# Patient Record
Sex: FEMALE | Race: BLACK OR AFRICAN AMERICAN | Employment: UNEMPLOYED | URBAN - METROPOLITAN AREA
[De-identification: names, ages, dates, MRNs, and addresses within clinical notes are randomized per-mention and may not be internally consistent; named-entity substitution may affect disease eponyms.]

---

## 2018-11-23 ENCOUNTER — HOSPITAL ENCOUNTER (EMERGENCY)
Age: 53
Discharge: HOME | End: 2018-11-24
Attending: EMERGENCY MEDICINE
Payer: COMMERCIAL

## 2018-11-23 ENCOUNTER — APPOINTMENT (OUTPATIENT)
Dept: GENERAL RADIOLOGY | Age: 53
End: 2018-11-23
Payer: COMMERCIAL

## 2018-11-23 VITALS
OXYGEN SATURATION: 98 % | TEMPERATURE: 98.1 F | HEART RATE: 72 BPM | SYSTOLIC BLOOD PRESSURE: 163 MMHG | RESPIRATION RATE: 18 BRPM | DIASTOLIC BLOOD PRESSURE: 103 MMHG

## 2018-11-23 DIAGNOSIS — S16.1XXA STRAIN OF NECK MUSCLE, INITIAL ENCOUNTER: ICD-10-CM

## 2018-11-23 DIAGNOSIS — M79.672 LEFT FOOT PAIN: ICD-10-CM
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
## 2018-11-23 DIAGNOSIS — S39.012A STRAIN OF LUMBAR REGION, INITIAL ENCOUNTER: ICD-10-CM

## 2018-11-23 PROCEDURE — 72100 X-RAY EXAM L-S SPINE 2/3 VWS: CPT

## 2018-11-23 PROCEDURE — 6370000000 HC RX 637 (ALT 250 FOR IP): Performed by: PHYSICIAN ASSISTANT

## 2018-11-23 PROCEDURE — 99284 EMERGENCY DEPT VISIT MOD MDM: CPT

## 2018-11-23 PROCEDURE — 72040 X-RAY EXAM NECK SPINE 2-3 VW: CPT

## 2018-11-23 PROCEDURE — 73630 X-RAY EXAM OF FOOT: CPT

## 2018-11-23 RX ORDER — OXYCODONE HYDROCHLORIDE AND ACETAMINOPHEN 5; 325 MG/1; MG/1
1 TABLET ORAL ONCE
Status: COMPLETED | OUTPATIENT
Start: 2018-11-23 | End: 2018-11-23

## 2018-11-23 RX ADMIN — OXYCODONE HYDROCHLORIDE AND ACETAMINOPHEN 1 TABLET: 5; 325 TABLET ORAL at 22:26

## 2018-11-23 ASSESSMENT — PAIN DESCRIPTION - PAIN TYPE: TYPE: ACUTE PAIN

## 2018-11-23 ASSESSMENT — PAIN SCALES - GENERAL: PAINLEVEL_OUTOF10: 7

## 2018-11-23 ASSESSMENT — PAIN DESCRIPTION - LOCATION: LOCATION: BACK

## 2018-11-23 ASSESSMENT — PAIN DESCRIPTION - ORIENTATION: ORIENTATION: LOWER

## 2018-11-24 ASSESSMENT — ENCOUNTER SYMPTOMS
EYE PAIN: 0
SORE THROAT: 0
BACK PAIN: 1
VOMITING: 0
COUGH: 0
EYE DISCHARGE: 0
RHINORRHEA: 0
NAUSEA: 0
WHEEZING: 0
COLOR CHANGE: 0
EYE ITCHING: 0
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
## 2019-01-31 ENCOUNTER — HOSPITAL ENCOUNTER (OUTPATIENT)
Age: 54
Setting detail: SPECIMEN
Discharge: HOME | End: 2019-01-31
Payer: COMMERCIAL

## 2019-01-31 LAB
BUN BLDV-MCNC: 8 MG/DL (ref 6–20)
C-REACTIVE PROTEIN: 2.5 MG/L (ref 0–5)
CREAT SERPL-MCNC: 0.53 MG/DL (ref 0.5–0.9)
GFR AFRICAN AMERICAN: >60 ML/MIN
GFR NON-AFRICAN AMERICAN: >60 ML/MIN
GFR SERPL CREATININE-BSD FRML MDRD: NORMAL ML/MIN/{1.73_M2}
GFR SERPL CREATININE-BSD FRML MDRD: NORMAL ML/MIN/{1.73_M2}
VANCOMYCIN TROUGH DATE LAST DOSE: ABNORMAL
VANCOMYCIN TROUGH DOSE AMOUNT: ABNORMAL
VANCOMYCIN TROUGH TIME LAST DOSE: ABNORMAL
VANCOMYCIN TROUGH: 8.7 UG/ML (ref 10–20)

## 2019-02-04 ENCOUNTER — HOSPITAL ENCOUNTER (OUTPATIENT)
Age: 54
Setting detail: SPECIMEN
Discharge: HOME | End: 2019-02-04
Payer: COMMERCIAL

## 2019-02-04 LAB
BUN BLDV-MCNC: 6 MG/DL (ref 6–20)
C-REACTIVE PROTEIN: 3.5 MG/L (ref 0–5)
CREAT SERPL-MCNC: 0.59 MG/DL (ref 0.5–0.9)
GFR AFRICAN AMERICAN: >60 ML/MIN
GFR NON-AFRICAN AMERICAN: >60 ML/MIN
GFR SERPL CREATININE-BSD FRML MDRD: NORMAL ML/MIN/{1.73_M2}
GFR SERPL CREATININE-BSD FRML MDRD: NORMAL ML/MIN/{1.73_M2}
VANCOMYCIN TROUGH DATE LAST DOSE: NORMAL
VANCOMYCIN TROUGH DOSE AMOUNT: NORMAL
VANCOMYCIN TROUGH TIME LAST DOSE: NORMAL
VANCOMYCIN TROUGH: 10.2 UG/ML (ref 10–20)

## 2019-02-11 ENCOUNTER — HOSPITAL ENCOUNTER (OUTPATIENT)
Age: 54
Setting detail: SPECIMEN
Discharge: HOME | End: 2019-02-11
Payer: COMMERCIAL

## 2019-02-11 LAB
BUN BLDV-MCNC: 10 MG/DL (ref 6–20)
C-REACTIVE PROTEIN: 1.1 MG/L (ref 0–5)
CREAT SERPL-MCNC: 0.68 MG/DL (ref 0.5–0.9)
GFR AFRICAN AMERICAN: >60 ML/MIN
GFR NON-AFRICAN AMERICAN: >60 ML/MIN
GFR SERPL CREATININE-BSD FRML MDRD: NORMAL ML/MIN/{1.73_M2}
GFR SERPL CREATININE-BSD FRML MDRD: NORMAL ML/MIN/{1.73_M2}
VANCOMYCIN TROUGH DATE LAST DOSE: ABNORMAL
VANCOMYCIN TROUGH DOSE AMOUNT: ABNORMAL
VANCOMYCIN TROUGH TIME LAST DOSE: ABNORMAL
VANCOMYCIN TROUGH: 9.3 UG/ML (ref 10–20)

## 2019-02-18 ENCOUNTER — HOSPITAL ENCOUNTER (OUTPATIENT)
Age: 54
Setting detail: SPECIMEN
Discharge: HOME | End: 2019-02-18
Payer: COMMERCIAL

## 2019-02-18 LAB
ANION GAP SERPL CALCULATED.3IONS-SCNC: 14 MMOL/L (ref 9–17)
BUN BLDV-MCNC: 14 MG/DL (ref 6–20)
BUN/CREAT BLD: ABNORMAL (ref 9–20)
C-REACTIVE PROTEIN: 3.8 MG/L (ref 0–5)
CALCIUM SERPL-MCNC: 9.7 MG/DL (ref 8.6–10.4)
CHLORIDE BLD-SCNC: 102 MMOL/L (ref 98–107)
CO2: 26 MMOL/L (ref 20–31)
CREAT SERPL-MCNC: 0.56 MG/DL (ref 0.5–0.9)
GFR AFRICAN AMERICAN: >60 ML/MIN
GFR NON-AFRICAN AMERICAN: >60 ML/MIN
GFR SERPL CREATININE-BSD FRML MDRD: ABNORMAL ML/MIN/{1.73_M2}
GFR SERPL CREATININE-BSD FRML MDRD: ABNORMAL ML/MIN/{1.73_M2}
GLUCOSE BLD-MCNC: 125 MG/DL (ref 70–99)
POTASSIUM SERPL-SCNC: 3.2 MMOL/L (ref 3.7–5.3)
SODIUM BLD-SCNC: 142 MMOL/L (ref 135–144)
VANCOMYCIN TROUGH DATE LAST DOSE: NORMAL
VANCOMYCIN TROUGH DOSE AMOUNT: NORMAL
VANCOMYCIN TROUGH TIME LAST DOSE: NORMAL
VANCOMYCIN TROUGH: 13 UG/ML (ref 10–20)

## 2019-02-20 ENCOUNTER — HOSPITAL ENCOUNTER (OUTPATIENT)
Age: 54
Setting detail: SPECIMEN
Discharge: HOME | End: 2019-02-20
Payer: COMMERCIAL

## 2019-02-20 LAB
ABSOLUTE EOS #: 0.13 K/UL (ref 0–0.44)
ABSOLUTE IMMATURE GRANULOCYTE: <0.03 K/UL (ref 0–0.3)
ABSOLUTE LYMPH #: 2.42 K/UL (ref 1.1–3.7)
ABSOLUTE MONO #: 0.48 K/UL (ref 0.1–1.2)
BASOPHILS # BLD: 1 % (ref 0–2)
BASOPHILS ABSOLUTE: 0.04 K/UL (ref 0–0.2)
DIFFERENTIAL TYPE: ABNORMAL
EOSINOPHILS RELATIVE PERCENT: 3 % (ref 1–4)
HCT VFR BLD CALC: 39.4 % (ref 36.3–47.1)
HEMOGLOBIN: 12.9 G/DL (ref 11.9–15.1)
IMMATURE GRANULOCYTES: 0 %
LYMPHOCYTES # BLD: 51 % (ref 24–43)
MCH RBC QN AUTO: 32.8 PG (ref 25.2–33.5)
MCHC RBC AUTO-ENTMCNC: 32.7 G/DL (ref 28.4–34.8)
MCV RBC AUTO: 100.3 FL (ref 82.6–102.9)
MONOCYTES # BLD: 10 % (ref 3–12)
NRBC AUTOMATED: 0 PER 100 WBC
PDW BLD-RTO: 13.3 % (ref 11.8–14.4)
PLATELET # BLD: 239 K/UL (ref 138–453)
PLATELET ESTIMATE: ABNORMAL
PMV BLD AUTO: 12.4 FL (ref 8.1–13.5)
RBC # BLD: 3.93 M/UL (ref 3.95–5.11)
RBC # BLD: ABNORMAL 10*6/UL
SEDIMENTATION RATE, ERYTHROCYTE: 6 MM (ref 0–20)
SEG NEUTROPHILS: 35 % (ref 36–65)
SEGMENTED NEUTROPHILS ABSOLUTE COUNT: 1.65 K/UL (ref 1.5–8.1)
WBC # BLD: 4.7 K/UL (ref 3.5–11.3)
WBC # BLD: ABNORMAL 10*3/UL

## 2019-02-25 ENCOUNTER — HOSPITAL ENCOUNTER (OUTPATIENT)
Age: 54
Setting detail: SPECIMEN
Discharge: HOME | End: 2019-02-25
Payer: COMMERCIAL

## 2019-02-25 LAB
ABSOLUTE EOS #: <0.03 K/UL (ref 0–0.44)
ABSOLUTE IMMATURE GRANULOCYTE: 0.03 K/UL (ref 0–0.3)
ABSOLUTE LYMPH #: 1.19 K/UL (ref 1.1–3.7)
ABSOLUTE MONO #: 0.15 K/UL (ref 0.1–1.2)
ANION GAP SERPL CALCULATED.3IONS-SCNC: 14 MMOL/L (ref 9–17)
BASOPHILS # BLD: 0 % (ref 0–2)
BASOPHILS ABSOLUTE: <0.03 K/UL (ref 0–0.2)
BUN BLDV-MCNC: 11 MG/DL (ref 6–20)
BUN/CREAT BLD: ABNORMAL (ref 9–20)
C-REACTIVE PROTEIN: 3.2 MG/L (ref 0–5)
CALCIUM SERPL-MCNC: 9.8 MG/DL (ref 8.6–10.4)
CHLORIDE BLD-SCNC: 98 MMOL/L (ref 98–107)
CO2: 24 MMOL/L (ref 20–31)
CREAT SERPL-MCNC: 0.71 MG/DL (ref 0.5–0.9)
DIFFERENTIAL TYPE: ABNORMAL
EOSINOPHILS RELATIVE PERCENT: 0 % (ref 1–4)
GFR AFRICAN AMERICAN: >60 ML/MIN
GFR NON-AFRICAN AMERICAN: >60 ML/MIN
GFR SERPL CREATININE-BSD FRML MDRD: ABNORMAL ML/MIN/{1.73_M2}
GFR SERPL CREATININE-BSD FRML MDRD: ABNORMAL ML/MIN/{1.73_M2}
GLUCOSE BLD-MCNC: 190 MG/DL (ref 70–99)
HCT VFR BLD CALC: 41.7 % (ref 36.3–47.1)
HEMOGLOBIN: 14 G/DL (ref 11.9–15.1)
IMMATURE GRANULOCYTES: 1 %
LYMPHOCYTES # BLD: 19 % (ref 24–43)
MCH RBC QN AUTO: 32.6 PG (ref 25.2–33.5)
MCHC RBC AUTO-ENTMCNC: 33.6 G/DL (ref 28.4–34.8)
MCV RBC AUTO: 97 FL (ref 82.6–102.9)
MONOCYTES # BLD: 2 % (ref 3–12)
NRBC AUTOMATED: 0 PER 100 WBC
PDW BLD-RTO: 13.3 % (ref 11.8–14.4)
PLATELET # BLD: 249 K/UL (ref 138–453)
PLATELET ESTIMATE: ABNORMAL
PMV BLD AUTO: 12.3 FL (ref 8.1–13.5)
POTASSIUM SERPL-SCNC: 3.4 MMOL/L (ref 3.7–5.3)
RBC # BLD: 4.3 M/UL (ref 3.95–5.11)
RBC # BLD: ABNORMAL 10*6/UL
SEDIMENTATION RATE, ERYTHROCYTE: 6 MM (ref 0–20)
SEG NEUTROPHILS: 78 % (ref 36–65)
SEGMENTED NEUTROPHILS ABSOLUTE COUNT: 4.94 K/UL (ref 1.5–8.1)
SODIUM BLD-SCNC: 136 MMOL/L (ref 135–144)
VANCOMYCIN TROUGH DATE LAST DOSE: NORMAL
VANCOMYCIN TROUGH DOSE AMOUNT: NORMAL
VANCOMYCIN TROUGH TIME LAST DOSE: NORMAL
VANCOMYCIN TROUGH: 12.1 UG/ML (ref 10–20)
WBC # BLD: 6.3 K/UL (ref 3.5–11.3)
WBC # BLD: ABNORMAL 10*3/UL

## 2019-03-04 ENCOUNTER — HOSPITAL ENCOUNTER (OUTPATIENT)
Age: 54
Setting detail: SPECIMEN
Discharge: HOME | End: 2019-03-04
Payer: COMMERCIAL

## 2019-03-04 LAB
ABSOLUTE EOS #: 0.21 K/UL (ref 0–0.44)
ABSOLUTE IMMATURE GRANULOCYTE: <0.03 K/UL (ref 0–0.3)
ABSOLUTE LYMPH #: 2.17 K/UL (ref 1.1–3.7)
ABSOLUTE MONO #: 0.46 K/UL (ref 0.1–1.2)
ANION GAP SERPL CALCULATED.3IONS-SCNC: 15 MMOL/L (ref 9–17)
BASOPHILS # BLD: 1 % (ref 0–2)
BASOPHILS ABSOLUTE: 0.03 K/UL (ref 0–0.2)
BUN BLDV-MCNC: 15 MG/DL (ref 6–20)
BUN/CREAT BLD: ABNORMAL (ref 9–20)
C-REACTIVE PROTEIN: 1.8 MG/L (ref 0–5)
CALCIUM SERPL-MCNC: 9.5 MG/DL (ref 8.6–10.4)
CHLORIDE BLD-SCNC: 104 MMOL/L (ref 98–107)
CO2: 25 MMOL/L (ref 20–31)
CREAT SERPL-MCNC: 0.87 MG/DL (ref 0.5–0.9)
DIFFERENTIAL TYPE: NORMAL
EOSINOPHILS RELATIVE PERCENT: 4 % (ref 1–4)
GFR AFRICAN AMERICAN: >60 ML/MIN
GFR NON-AFRICAN AMERICAN: >60 ML/MIN
GFR SERPL CREATININE-BSD FRML MDRD: ABNORMAL ML/MIN/{1.73_M2}
GFR SERPL CREATININE-BSD FRML MDRD: ABNORMAL ML/MIN/{1.73_M2}
GLUCOSE BLD-MCNC: 115 MG/DL (ref 70–99)
HCT VFR BLD CALC: 42.5 % (ref 36.3–47.1)
HEMOGLOBIN: 13.5 G/DL (ref 11.9–15.1)
IMMATURE GRANULOCYTES: 0 %
LYMPHOCYTES # BLD: 43 % (ref 24–43)
MCH RBC QN AUTO: 31.8 PG (ref 25.2–33.5)
MCHC RBC AUTO-ENTMCNC: 31.8 G/DL (ref 28.4–34.8)
MCV RBC AUTO: 100 FL (ref 82.6–102.9)
MONOCYTES # BLD: 9 % (ref 3–12)
NRBC AUTOMATED: 0 PER 100 WBC
PDW BLD-RTO: 13.4 % (ref 11.8–14.4)
PLATELET # BLD: 296 K/UL (ref 138–453)
PLATELET ESTIMATE: NORMAL
PMV BLD AUTO: 12.4 FL (ref 8.1–13.5)
POTASSIUM SERPL-SCNC: 3.5 MMOL/L (ref 3.7–5.3)
RBC # BLD: 4.25 M/UL (ref 3.95–5.11)
RBC # BLD: NORMAL 10*6/UL
SEDIMENTATION RATE, ERYTHROCYTE: 4 MM (ref 0–20)
SEG NEUTROPHILS: 43 % (ref 36–65)
SEGMENTED NEUTROPHILS ABSOLUTE COUNT: 2.15 K/UL (ref 1.5–8.1)
SODIUM BLD-SCNC: 144 MMOL/L (ref 135–144)
WBC # BLD: 5 K/UL (ref 3.5–11.3)
WBC # BLD: NORMAL 10*3/UL

## 2019-03-05 ENCOUNTER — HOSPITAL ENCOUNTER (OUTPATIENT)
Age: 54
Setting detail: SPECIMEN
Discharge: HOME | End: 2019-03-05
Payer: COMMERCIAL

## 2019-03-05 LAB
VANCOMYCIN TROUGH DATE LAST DOSE: NORMAL
VANCOMYCIN TROUGH DOSE AMOUNT: NORMAL
VANCOMYCIN TROUGH TIME LAST DOSE: NORMAL
VANCOMYCIN TROUGH: 18.6 UG/ML (ref 10–20)

## 2019-03-08 ENCOUNTER — HOSPITAL ENCOUNTER (OUTPATIENT)
Age: 54
Setting detail: SPECIMEN
Discharge: HOME | End: 2019-03-08
Payer: COMMERCIAL

## 2019-03-08 LAB
CREAT SERPL-MCNC: 1.06 MG/DL (ref 0.5–0.9)
GFR AFRICAN AMERICAN: >60 ML/MIN
GFR NON-AFRICAN AMERICAN: 54 ML/MIN
GFR SERPL CREATININE-BSD FRML MDRD: ABNORMAL ML/MIN/{1.73_M2}
GFR SERPL CREATININE-BSD FRML MDRD: ABNORMAL ML/MIN/{1.73_M2}
VANCOMYCIN TROUGH DATE LAST DOSE: ABNORMAL
VANCOMYCIN TROUGH DOSE AMOUNT: ABNORMAL
VANCOMYCIN TROUGH TIME LAST DOSE: ABNORMAL
VANCOMYCIN TROUGH: 22.5 UG/ML (ref 10–20)

## 2019-05-08 ENCOUNTER — HOSPITAL ENCOUNTER (EMERGENCY)
Age: 54
Discharge: HOME | End: 2019-05-08
Attending: EMERGENCY MEDICINE
Payer: COMMERCIAL

## 2019-05-08 VITALS
SYSTOLIC BLOOD PRESSURE: 150 MMHG | RESPIRATION RATE: 14 BRPM | HEIGHT: 59 IN | BODY MASS INDEX: 32.25 KG/M2 | OXYGEN SATURATION: 98 % | WEIGHT: 160 LBS | DIASTOLIC BLOOD PRESSURE: 82 MMHG | TEMPERATURE: 98.5 F | HEART RATE: 90 BPM

## 2019-05-08 DIAGNOSIS — L50.9 URTICARIA: Primary | ICD-10-CM

## 2019-05-08 PROCEDURE — 6360000002 HC RX W HCPCS: Performed by: NURSE PRACTITIONER

## 2019-05-08 PROCEDURE — 99282 EMERGENCY DEPT VISIT SF MDM: CPT

## 2019-05-08 PROCEDURE — 96372 THER/PROPH/DIAG INJ SC/IM: CPT

## 2019-05-08 RX ORDER — DIPHENHYDRAMINE HCL 25 MG
25 TABLET ORAL EVERY 6 HOURS PRN
Qty: 20 TABLET | Refills: 0 | Status: SHIPPED | OUTPATIENT
Start: 2019-05-08

## 2019-05-08 RX ORDER — DEXAMETHASONE SODIUM PHOSPHATE 10 MG/ML
10 INJECTION, SOLUTION INTRAMUSCULAR; INTRAVENOUS ONCE
Status: COMPLETED | OUTPATIENT
Start: 2019-05-08 | End: 2019-05-08

## 2019-05-08 RX ORDER — PREDNISONE 10 MG/1
10 TABLET ORAL DAILY
Qty: 27 TABLET | Refills: 0 | Status: SHIPPED | OUTPATIENT
Start: 2019-05-08 | End: 2019-05-18

## 2019-05-08 RX ADMIN — DEXAMETHASONE SODIUM PHOSPHATE 10 MG: 10 INJECTION, SOLUTION INTRAMUSCULAR; INTRAVENOUS at 16:57

## 2019-05-08 ASSESSMENT — PAIN SCALES - GENERAL: PAINLEVEL_OUTOF10: 10

## 2019-05-08 ASSESSMENT — ENCOUNTER SYMPTOMS
SHORTNESS OF BREATH: 0
WHEEZING: 0
TROUBLE SWALLOWING: 0

## 2019-05-08 ASSESSMENT — PAIN DESCRIPTION - LOCATION: LOCATION: GENERALIZED

## 2019-05-08 NOTE — ED PROVIDER NOTES
The patient was seen and examined by me in conjunction with the mid-level provider. I agree with his/her assessment and treatment plan. My clinical impression is that the patient has had an allergic reaction. Tongue is not swollen.      Dania Halsted, MD  05/08/19 2014

## 2019-05-08 NOTE — PROGRESS NOTES
Emergency Department Pharmacist Note    Patient presents to the ED with complaints of:   Chief Complaint   Patient presents with    Allergic Reaction     Patient was seen by the ED pharmacist and offered counseling. Patient seen for potential allergic reaction with rash and spots over her body. Reaction is suspected to be due from a titanium and gold plate in her foot and not from medications. Patient has not had any new medications recently. If there are further medication questions please consult pharmacy again.      Rj Woodward, PharmD  5/8/2019  4:52 PM

## 2019-05-08 NOTE — ED PROVIDER NOTES
[facility] ED  eMERGENCY dEPARTMENT eNCOUnter      Pt Name: Quirino Lincoln  MRN: [id number]  [place] 1965  Date of evaluation: 5/8/2019  Provider: ELO Batista CNP    CHIEF COMPLAINT       Chief Complaint   Patient presents with    Allergic Reaction         HISTORY OFPRESENT ILLNESS  (Location/Symptom, Timing/Onset, Context/Setting, Quality, Duration, Modifying Factors, Severity.)   Quirino Lincoln is a 47 y.o. female who presents to the emergency department by private auto for evaluation of hives that started earlier today. Patient had allergy list in 2 days ago and back to her allergist today and when he saw her rash eccentric emergency department. She denies difficulty swallowing or shortness of breath. She complains of urticaria on her arms and breasts. She states her left breast is tender from her rash. Her pain is a 10 out of 10. Nursing Notes were reviewed.     PASTMEDICAL HISTORY     Past Medical History:   Diagnosis Date    Arthritis 2007    BILAT KNEES    Bruising 9/23/14    states just appeared, has had before,  Went to PCP, blood work done   [place] Diabetes mellitus ([facility] [address]     Juan Ribeiro NO RX    Hypertension 2002    Hypokalemia     CHRONIC         SURGICAL HISTORY       Past Surgical History:   Procedure Laterality Date    BREAST CYST EXCISION Left 1980    FOOT SURGERY Bilateral 1994    HAMMER TOES    HAMMER TOE SURGERY Right 9/25/2014    Right 5th digit repair    HYSTERECTOMY  2013    PARTIAL    JOINT REPLACEMENT Left 2008    PARTIAL KNEE    JOINT REPLACEMENT Left 02/08/2010    TOTAL KNEE    JOINT REPLACEMENT Right 08/20/2012    TOTAL KNEE    TUBAL LIGATION  2011         CURRENT MEDICATIONS     Discharge Medication List as of 5/8/2019  6:29 PM      CONTINUE these medications which have NOT CHANGED    Details   oxyCODONE-acetaminophen (PERCOCET) 7.5-325 MG per tablet Take 1 tablet by mouth every 12 hours as needed for Pain      oxyCODONE HCl ER 20 MG CR tablet Take by mouth every 12 hours      fexofenadine (ALLEGRA) 180 MG tablet Take 180 mg by mouth daily      predniSONE (DELTASONE) 10 MG tablet pack Take 4 tabs daily for 3 days,  Then 3 tabs daily for 3 days,  Then 2 tabs daily for 3 days,  Than 1 tab daily for 3 days. , Disp-30 tablet, R-0, Print      amitriptyline (ELAVIL) 50 MG tablet Take 50 mg by mouth nightly as needed for Sleep. clobetasol (TEMOVATE) 0.05 % ointment Apply  topically 2 times daily as needed (ECZEMA). Apply topically 2 times daily. , Topical, Historical Med      fluticasone (FLONASE) 50 MCG/ACT nasal spray 2 sprays by Nasal route daily as needed for Rhinitis. hydrochlorothiazide (HYDRODIURIL) 25 MG tablet Take 25 mg by mouth daily. meloxicam (MOBIC) 15 MG tablet Take 15 mg by mouth daily. polyethylene glycol (GLYCOLAX) powder Take 17 g by mouth daily as needed. Potassium (POTASSIMIN PO) Take 1 tablet by mouth daily. metoprolol (TOPROL-XL) 100 MG XL tablet Take 100 mg by mouth daily. Selenium 100 MCG CAPS Take 1 capsule by mouth daily. vitamin B-12 (CYANOCOBALAMIN) 100 MCG tablet Take 50 mcg by mouth daily. Grape Seed 60 MG CAPS Take 1 capsule by mouth daily. Coenzyme Q10 (CO Q 10) 60 MG CAPS Take 1 capsule by mouth daily. WITH FISH OIL      Ferrous Sulfate (IRON) 325 (65 FE) MG TABS Take 1 capsule by mouth daily. Biotin 7500 MCG TABS Take 1 capsule by mouth daily. NONFORMULARY Take 600 mg by mouth daily. CINNAMON BARK             ALLERGIES     Latex;  Bactrim [sulfamethoxazole-trimethoprim]; and Morphine    FAMILY HISTORY       Family History   Problem Relation Age of Onset    High Blood Pressure Mother     Other Mother         [facility]   [facility] Diabetes Father     Asthma Sister     Diabetes Sister     Diabetes Sister     Diabetes Sister           SOCIAL HISTORY       Social History     Socioeconomic History    Marital status: Single     Spouse name: None    Number of children: None    Nose normal.   Mouth/Throat: Uvula is midline, oropharynx is clear and moist and mucous membranes are normal. No uvula swelling. No posterior oropharyngeal edema. There is no swelling of the throat, tongue or lips. Eyes: Pupils are equal, round, and reactive to light. Conjunctivae and EOM are normal.   Neck: Normal range of motion. Neck supple. Cardiovascular: Normal rate, regular rhythm, normal heart sounds and intact distal pulses. Pulmonary/Chest: Effort normal and breath sounds normal. She exhibits tenderness and swelling. Examination of the breasts and chest wall was chaperoned by the nurse Juliette Nolasco. There is urticaria noted to both breasts. There is some tenderness to palpation to left breast.       Musculoskeletal: Normal range of motion. Neurological: She is alert and oriented to person, place, and time. She has normal strength. No cranial nerve deficit or sensory deficit. GCS eye subscore is 4. GCS verbal subscore is 5. GCS motor subscore is 6. Skin: Skin is warm, dry and intact. Capillary refill takes less than 2 seconds. Rash noted. Rash is urticarial.        There is urticaria noted on the patient's breast and arms. Psychiatric: She has a normal mood and affect.  Her behavior is normal. Judgment and thought content normal.         DIAGNOSTIC RESULTS     EKG:All EKG's are interpreted by the Emergency Department Physician who either signs or Co-signs this chart in the absence of a cardiologist.        RADIOLOGY:   Non-plain film images such as CT, Ultrasound and MRI are read by theradiologist. Plain radiographic images are visualized and preliminarily interpreted by the emergency physician with the below findings:        Interpretation per the Radiologist below, if available at the time of this note:    No orders to display         EDBEDSIDE ULTRASOUND:   Performed by Bobby Cunha - mark    LABS:  [unfilled]    All other labs were within normal range or not returned as of this

## 2019-05-08 NOTE — ED NOTES
Undergoing patch allergy testing at [facility] me here because I'm breaking out in hives and my voice is hoarse\"   resp even non labored no drooling     Roland Chavez RN  05/08/19 [id number]

## 2020-08-24 ENCOUNTER — HOSPITAL ENCOUNTER (EMERGENCY)
Age: 55
Discharge: HOME | End: 2020-08-24
Attending: EMERGENCY MEDICINE
Payer: COMMERCIAL

## 2020-08-24 VITALS
HEART RATE: 72 BPM | BODY MASS INDEX: 33.77 KG/M2 | WEIGHT: 172 LBS | RESPIRATION RATE: 14 BRPM | SYSTOLIC BLOOD PRESSURE: 130 MMHG | DIASTOLIC BLOOD PRESSURE: 66 MMHG | OXYGEN SATURATION: 98 % | TEMPERATURE: 98.5 F | HEIGHT: 60 IN

## 2020-08-24 PROCEDURE — 99282 EMERGENCY DEPT VISIT SF MDM: CPT

## 2020-08-24 PROCEDURE — 6360000002 HC RX W HCPCS: Performed by: NURSE PRACTITIONER

## 2020-08-24 PROCEDURE — 90715 TDAP VACCINE 7 YRS/> IM: CPT | Performed by: NURSE PRACTITIONER

## 2020-08-24 PROCEDURE — 90471 IMMUNIZATION ADMIN: CPT | Performed by: NURSE PRACTITIONER

## 2020-08-24 RX ADMIN — TETANUS TOXOID, REDUCED DIPHTHERIA TOXOID AND ACELLULAR PERTUSSIS VACCINE, ADSORBED 0.5 ML: 5; 2.5; 8; 8; 2.5 SUSPENSION INTRAMUSCULAR at 11:49

## 2020-08-24 ASSESSMENT — ENCOUNTER SYMPTOMS
SHORTNESS OF BREATH: 0
COUGH: 0
VOMITING: 0
CONSTIPATION: 0
NAUSEA: 0
SINUS PRESSURE: 0
COLOR CHANGE: 0
WHEEZING: 0
SORE THROAT: 0
RHINORRHEA: 0
ABDOMINAL PAIN: 0
DIARRHEA: 0

## 2020-08-24 ASSESSMENT — PAIN DESCRIPTION - ONSET: ONSET: SUDDEN

## 2020-08-24 ASSESSMENT — PAIN DESCRIPTION - PAIN TYPE: TYPE: ACUTE PAIN

## 2020-08-24 ASSESSMENT — PAIN DESCRIPTION - DESCRIPTORS: DESCRIPTORS: DISCOMFORT

## 2020-08-24 ASSESSMENT — PAIN SCALES - GENERAL: PAINLEVEL_OUTOF10: 7

## 2020-08-24 ASSESSMENT — PAIN DESCRIPTION - LOCATION: LOCATION: HEAD

## 2020-08-24 NOTE — ED PROVIDER NOTES
Nasal route daily as needed for Rhinitis. GRAPE SEED 60 MG CAPS    Take 1 capsule by mouth daily. HYDROCHLOROTHIAZIDE (HYDRODIURIL) 25 MG TABLET    Take 25 mg by mouth daily. MELOXICAM (MOBIC) 15 MG TABLET    Take 15 mg by mouth daily. METOPROLOL (TOPROL-XL) 100 MG XL TABLET    Take 100 mg by mouth daily. NONFORMULARY    Take 600 mg by mouth daily. CINNAMON BARK    OXYCODONE HCL ER 20 MG CR TABLET    Take by mouth every 12 hours    OXYCODONE-ACETAMINOPHEN (PERCOCET) 7.5-325 MG PER TABLET    Take 1 tablet by mouth every 12 hours as needed for Pain    POLYETHYLENE GLYCOL (GLYCOLAX) POWDER    Take 17 g by mouth daily as needed. POTASSIUM (POTASSIMIN PO)    Take 1 tablet by mouth daily. PREDNISONE (DELTASONE) 10 MG TABLET PACK    Take 4 tabs daily for 3 days,  Then 3 tabs daily for 3 days,  Then 2 tabs daily for 3 days,  Than 1 tab daily for 3 days. SELENIUM 100 MCG CAPS    Take 1 capsule by mouth daily. VITAMIN B-12 (CYANOCOBALAMIN) 100 MCG TABLET    Take 50 mcg by mouth daily.        PAST MEDICAL HISTORY         Diagnosis Date    Arthritis 2007    BILAT KNEES    Bruising 9/23/14    states just appeared, has had before,  Went to PCP, blood work done   [place] Diabetes mellitus ([facility] [address]     [facility] NO RX    Hypertension 2002    Hypokalemia     CHRONIC       SURGICAL HISTORY           Procedure Laterality Date    BREAST CYST EXCISION Left 1980    FOOT SURGERY Bilateral 1994    HAMMER TOES    HAMMER TOE SURGERY Right 9/25/2014    Right 5th digit repair    HYSTERECTOMY  2013    PARTIAL    JOINT REPLACEMENT Left 2008    PARTIAL KNEE    JOINT REPLACEMENT Left 02/08/2010    TOTAL KNEE    JOINT REPLACEMENT Right 08/20/2012    TOTAL KNEE    TUBAL LIGATION  2011         FAMILY HISTORY           Problem Relation Age of Onset    High Blood Pressure Mother     Other Mother         Avel Vilchis Diabetes Father     Asthma Sister     Diabetes Sister     Diabetes Sister     Diabetes Sister Family Status   Relation Name Status    Mother Becca Left     Father Maria Dolores Escamilla Sister Savannah Masts Alive    Brother Royal Diego MGM      MGF      PGM      PGF      Sister BOYD Alive    Sister QIAN Alive        SOCIAL HISTORY      reports that she has never smoked. She has never used smokeless tobacco. She reports current alcohol use. She reports current drug use. Drug: Marijuana. REVIEW OF SYSTEMS    (2-9 systems for level 4, 10 or more for level 5)     Review of Systems   Constitutional: Negative for chills, fever and unexpected weight change. HENT: Negative for congestion, rhinorrhea, sinus pressure and sore throat. Respiratory: Negative for cough, shortness of breath and wheezing. Cardiovascular: Negative for chest pain and palpitations. Gastrointestinal: Negative for abdominal pain, constipation, diarrhea, nausea and vomiting. Genitourinary: Negative for dysuria and hematuria. Musculoskeletal: Negative for arthralgias and myalgias. Skin: Negative for color change and rash. Neurological: Negative for dizziness, weakness and headaches. Hematological: Negative for adenopathy. All other systems reviewed and are negative. Except as noted above the remainder of the review of systems was reviewed and negative. PHYSICAL EXAM    (up to 7 for level 4, 8 or more for level 5)     ED Triage Vitals [20 1122]   BP Temp Temp Source Pulse Resp SpO2 Height Weight   130/66 98.5 °F (36.9 °C) Oral 72 14 98 % 5' (1.524 m) 172 lb (78 kg)       Physical Exam  Vitals signs reviewed. Constitutional:       Appearance: She is well-developed. HENT:      Head: Normocephalic and atraumatic. Eyes:      Conjunctiva/sclera: Conjunctivae normal.      Pupils: Pupils are equal, round, and reactive to light. Neck:      Musculoskeletal: Normal range of motion and neck supple. Cardiovascular:      Rate and Rhythm: Normal rate and regular rhythm. Pulmonary:      Effort: Pulmonary effort is normal. No respiratory distress. Breath sounds: Normal breath sounds. No stridor. Abdominal:      General: Bowel sounds are normal.      Palpations: Abdomen is soft. Musculoskeletal: Normal range of motion. Lymphadenopathy:      Cervical: No cervical adenopathy. Skin:     General: Skin is warm and dry. Findings: No rash. Neurological:      Mental Status: She is alert and oriented to person, place, and time. LABS:  Labs Reviewed - No data to display    All other labs were within normal range or not returned as of this dictation. EMERGENCY DEPARTMENT COURSE and DIFFERENTIAL DIAGNOSIS/MDM:   Vitals:    Vitals:    08/24/20 1122   BP: 130/66   Pulse: 72   Resp: 14   Temp: 98.5 °F (36.9 °C)   TempSrc: Oral   SpO2: 98%   Weight: 172 lb (78 kg)   Height: 5' (1.524 m)       Medical Decision Making: ct head is not indicated the patient does not have a headache. She has a small scratch onback of her head so tetanus will be updated. FINAL IMPRESSION      1.  Closed head injury, initial encounter          DISPOSITION/PLAN   DISPOSITION Decision To Discharge 08/24/2020 11:37:26 AM      PATIENT REFERRED TO:   Kervin Tripp, [address] [address] [facility]  [phone]    Schedule an appointment as soon as possible for a visit       [facility] ED  [address] [facility]  [phone]    If symptoms worsen      DISCHARGE MEDICATIONS:     New Prescriptions    No medications on file           (Please note that portions of this note were completed with a voice recognition program.  Efforts were made to edit the dictations but occasionally words are mis-transcribed.)    Eden Thomas NP, APRN - [address] [facility]  Certified Nurse Practitioner          ELO Okeefe - CNP  08/24/20 4341

## 2021-03-13 ENCOUNTER — HOSPITAL ENCOUNTER (EMERGENCY)
Age: 56
Discharge: HOME | End: 2021-03-13
Attending: EMERGENCY MEDICINE
Payer: COMMERCIAL

## 2021-03-13 ENCOUNTER — APPOINTMENT (OUTPATIENT)
Dept: GENERAL RADIOLOGY | Age: 56
End: 2021-03-13
Payer: COMMERCIAL

## 2021-03-13 ENCOUNTER — APPOINTMENT (OUTPATIENT)
Dept: CT IMAGING | Age: 56
End: 2021-03-13
Payer: COMMERCIAL

## 2021-03-13 VITALS
SYSTOLIC BLOOD PRESSURE: 179 MMHG | HEIGHT: 59 IN | TEMPERATURE: 98.3 F | DIASTOLIC BLOOD PRESSURE: 76 MMHG | RESPIRATION RATE: 14 BRPM | OXYGEN SATURATION: 99 % | WEIGHT: 165 LBS | BODY MASS INDEX: 33.26 KG/M2

## 2021-03-13 DIAGNOSIS — S92.352A CLOSED DISPLACED FRACTURE OF FIFTH METATARSAL BONE OF LEFT FOOT, INITIAL ENCOUNTER: ICD-10-CM
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
## 2021-03-13 DIAGNOSIS — S22.41XA CLOSED FRACTURE OF MULTIPLE RIBS OF RIGHT SIDE, INITIAL ENCOUNTER: ICD-10-CM

## 2021-03-13 LAB
-: ABNORMAL
AMORPHOUS: ABNORMAL
BACTERIA: ABNORMAL
BILIRUBIN URINE: NEGATIVE
CASTS UA: ABNORMAL /LPF
COLOR: YELLOW
COMMENT UA: ABNORMAL
CRYSTALS, UA: ABNORMAL /HPF
EPITHELIAL CELLS UA: ABNORMAL /HPF (ref 0–5)
GLUCOSE URINE: NEGATIVE
KETONES, URINE: NEGATIVE
LEUKOCYTE ESTERASE, URINE: NEGATIVE
MUCUS: ABNORMAL
NITRITE, URINE: NEGATIVE
OTHER OBSERVATIONS UA: ABNORMAL
PH UA: 5.5 (ref 5–8)
PROTEIN UA: NEGATIVE
RBC UA: ABNORMAL /HPF (ref 0–2)
RENAL EPITHELIAL, UA: ABNORMAL /HPF
SPECIFIC GRAVITY UA: 1.02 (ref 1–1.03)
TRICHOMONAS: ABNORMAL
TURBIDITY: CLEAR
URINE HGB: NEGATIVE
UROBILINOGEN, URINE: NORMAL
WBC UA: ABNORMAL /HPF (ref 0–5)
YEAST: ABNORMAL

## 2021-03-13 PROCEDURE — 72100 X-RAY EXAM L-S SPINE 2/3 VWS: CPT

## 2021-03-13 PROCEDURE — 99283 EMERGENCY DEPT VISIT LOW MDM: CPT

## 2021-03-13 PROCEDURE — 73630 X-RAY EXAM OF FOOT: CPT

## 2021-03-13 PROCEDURE — 81001 URINALYSIS AUTO W/SCOPE: CPT

## 2021-03-13 PROCEDURE — 71101 X-RAY EXAM UNILAT RIBS/CHEST: CPT

## 2021-03-13 PROCEDURE — 72040 X-RAY EXAM NECK SPINE 2-3 VW: CPT

## 2021-03-13 PROCEDURE — 74176 CT ABD & PELVIS W/O CONTRAST: CPT

## 2021-03-13 ASSESSMENT — PAIN DESCRIPTION - PAIN TYPE: TYPE: ACUTE PAIN

## 2021-03-13 ASSESSMENT — ENCOUNTER SYMPTOMS
COLOR CHANGE: 1
DIARRHEA: 0
BACK PAIN: 1
FACIAL SWELLING: 0
SHORTNESS OF BREATH: 0
PHOTOPHOBIA: 0
VOICE CHANGE: 0
NAUSEA: 0
COUGH: 0
EYE PAIN: 0
ABDOMINAL PAIN: 0
VOMITING: 0
TROUBLE SWALLOWING: 0

## 2021-03-13 ASSESSMENT — PAIN DESCRIPTION - ORIENTATION: ORIENTATION: RIGHT

## 2021-03-13 ASSESSMENT — PAIN DESCRIPTION - FREQUENCY: FREQUENCY: CONTINUOUS

## 2021-03-13 NOTE — ED PROVIDER NOTES
[facility] (formerly [facility]) ED  eMERGENCY dEPARTMENT eNCOUnter      Pt Name: Nathan Malloy  MRN: [id number]  [place] 1965  Date of evaluation: 3/13/2021  Provider: ELO Salinas CNP    CHIEF COMPLAINT       Chief Complaint   Patient presents with    Fall     one week ago         HISTORY OF PRESENT ILLNESS  (Location/Symptom, Timing/Onset, Context/Setting, Quality, Duration, Modifying Factors, Severity.)   Nathan Malloy is a 48 y.o. female who presents to the emergency department via private auto for pain to her left foot, right flank, right rib area, right upper back s/p fall about a week ago. States she has issues with her left foot which caused her to fall down steps. Denies hitting her head and LOC. She has bruising to her right shoulder area and right flank. Denies urinary symptoms, change in bowel and/or bladder control, dizziness, N/V. Denies SOB. The pain increases with movement. Rates her pain 10/10 at this time. The foot pain is in the area of the 5th digit. She has had multiple surgeries on the foot. Nursing Notes were reviewed. ALLERGIES     Latex, Bactrim [sulfamethoxazole-trimethoprim], and Morphine    CURRENT MEDICATIONS       Previous Medications    AMITRIPTYLINE (ELAVIL) 50 MG TABLET    Take 50 mg by mouth nightly as needed for Sleep. BIOTIN 7500 MCG TABS    Take 1 capsule by mouth daily. CLOBETASOL (TEMOVATE) 0.05 % OINTMENT    Apply  topically 2 times daily as needed (ECZEMA). Apply topically 2 times daily. COENZYME Q10 (CO Q 10) 60 MG CAPS    Take 1 capsule by mouth daily. WITH FISH OIL    DIPHENHYDRAMINE (BENADRYL ALLERGY) 25 MG TABLET    Take 1 tablet by mouth every 6 hours as needed for Itching    FERROUS SULFATE (IRON) 325 (65 FE) MG TABS    Take 1 capsule by mouth daily. FEXOFENADINE (ALLEGRA) 180 MG TABLET    Take 180 mg by mouth daily    FLUTICASONE (FLONASE) 50 MCG/ACT NASAL SPRAY    2 sprays by Nasal route daily as needed for Rhinitis.     GRAPE SEED 60 MG CAPS    Take 1 capsule by mouth daily. HYDROCHLOROTHIAZIDE (HYDRODIURIL) 25 MG TABLET    Take 25 mg by mouth daily. MELOXICAM (MOBIC) 15 MG TABLET    Take 15 mg by mouth daily. METOPROLOL (TOPROL-XL) 100 MG XL TABLET    Take 100 mg by mouth daily. NONFORMULARY    Take 600 mg by mouth daily. CINNAMON BARK    OXYCODONE HCL ER 20 MG CR TABLET    Take by mouth every 12 hours    OXYCODONE-ACETAMINOPHEN (PERCOCET) 7.5-325 MG PER TABLET    Take 1 tablet by mouth every 12 hours as needed for Pain    POLYETHYLENE GLYCOL (GLYCOLAX) POWDER    Take 17 g by mouth daily as needed. POTASSIUM (POTASSIMIN PO)    Take 1 tablet by mouth daily. PREDNISONE (DELTASONE) 10 MG TABLET PACK    Take 4 tabs daily for 3 days,  Then 3 tabs daily for 3 days,  Then 2 tabs daily for 3 days,  Than 1 tab daily for 3 days. SELENIUM 100 MCG CAPS    Take 1 capsule by mouth daily. VITAMIN B-12 (CYANOCOBALAMIN) 100 MCG TABLET    Take 50 mcg by mouth daily.        PAST MEDICAL HISTORY         Diagnosis Date    Arthritis 2007    BILAT KNEES    Bruising 9/23/14    states just appeared, has had before,  Went to PCP, blood work done   [facility] Diabetes mellitus ([facility] [address]     Rosalind Banks NO RX    Hypertension 2002    Hypokalemia     CHRONIC       SURGICAL HISTORY           Procedure Laterality Date    BREAST CYST EXCISION Left 1980    FOOT SURGERY Bilateral 1994    HAMMER TOES    HAMMER TOE SURGERY Right 9/25/2014    Right 5th digit repair    HYSTERECTOMY  2013    PARTIAL    JOINT REPLACEMENT Left 2008    PARTIAL KNEE    JOINT REPLACEMENT Left 02/08/2010    TOTAL KNEE    JOINT REPLACEMENT Right 08/20/2012    TOTAL KNEE    TUBAL LIGATION  2011         FAMILY HISTORY           Problem Relation Age of Onset    High Blood Pressure Mother     Other Mother         Shaniqua Ward Diabetes Father     Asthma Sister     Diabetes Sister     Diabetes Sister     Diabetes Sister      Family Status   Relation Name Status    Mother Carma Frankel     Father Farhat Sergeant Sister Bharath Apodaca Alive    Brother Akbar Connelly    MGM      MGF      PGM      PGF      Sister BODY Alive    Sister QIAN Alive        SOCIAL HISTORY      reports that she has never smoked. She has never used smokeless tobacco. She reports current alcohol use. She reports current drug use. Drug: Marijuana. REVIEW OF SYSTEMS    (2-9 systems for level 4, 10 or more for level 5)     Review of Systems   Constitutional: Negative for chills, diaphoresis, fatigue and fever. HENT: Negative for facial swelling, nosebleeds, trouble swallowing and voice change. Eyes: Negative for photophobia, pain and visual disturbance. Respiratory: Negative for cough and shortness of breath. Cardiovascular: Negative for chest pain. Gastrointestinal: Negative for abdominal pain, diarrhea, nausea and vomiting. Genitourinary: Negative for difficulty urinating, dysuria and hematuria. Musculoskeletal: Positive for arthralgias, back pain and myalgias. Negative for gait problem and neck pain. Skin: Positive for color change. Negative for rash and wound. Neurological: Negative for dizziness, syncope, facial asymmetry, speech difficulty, weakness, light-headedness, numbness and headaches. Psychiatric/Behavioral: Negative for confusion. Except as noted above the remainder of the review of systems was reviewed and negative. PHYSICAL EXAM    (up to 7 for level 4, 8 or more for level 5)     ED Triage Vitals [21 1417]   BP Temp Temp Source Pulse Resp SpO2 Height Weight   (!) 179/76 98.3 °F (36.8 °C) Oral -- 14 99 % 4' 11\" (1.499 m) 165 lb (74.8 kg)     Physical Exam  Vitals signs reviewed. Constitutional:       General: She is not in acute distress. Appearance: She is well-developed. She is not diaphoretic. HENT:      Head: No raccoon eyes or Haji's sign.       Right Ear: External ear normal.      Left Ear: External ear normal. Eyes:      General: No scleral icterus. Extraocular Movements: Extraocular movements intact. Conjunctiva/sclera: Conjunctivae normal.      Pupils: Pupils are equal, round, and reactive to light. Neck:      Vascular: No JVD. Cardiovascular:      Rate and Rhythm: Normal rate. Pulmonary:      Effort: Pulmonary effort is normal. No respiratory distress. Breath sounds: No stridor. Chest:      Chest wall: Tenderness (right lateral rib area) present. Abdominal:      General: There is no distension. Palpations: Abdomen is soft. Tenderness: There is no abdominal tenderness. There is right CVA tenderness. There is no left CVA tenderness or guarding. Musculoskeletal:      Left ankle: She exhibits normal range of motion, no swelling, no deformity and normal pulse. Cervical back: She exhibits no tenderness, no bony tenderness and no pain. Thoracic back: She exhibits no tenderness, no bony tenderness and no pain. Lumbar back: She exhibits tenderness and pain. She exhibits no bony tenderness, no swelling and no deformity. Back:       Left foot: Normal capillary refill. Tenderness present. No swelling or deformity. Comments: Moves extremities   Skin:     General: Skin is warm and dry. Capillary Refill: Capillary refill takes less than 2 seconds. Findings: No rash. Neurological:      General: No focal deficit present. Mental Status: She is alert and oriented to person, place, and time. GCS: GCS eye subscore is 4. GCS verbal subscore is 5. GCS motor subscore is 6. Cranial Nerves: Cranial nerves are intact. No cranial nerve deficit. Motor: Motor function is intact. No weakness. Coordination: Coordination is intact.    Psychiatric:         Behavior: Behavior normal.           DIAGNOSTIC RESULTS     RADIOLOGY:   Non-plain film images such as CT, Ultrasound and MRI are read by the radiologist. Plain radiographic images are visualized and preliminarily interpreted by the emergency physician with the below findings:    Interpretation per the Radiologist below, if available at the time of this note:    Ct Abdomen Pelvis Wo Contrast Additional Contrast? None    Result Date: 3/13/2021  EXAMINATION: CT OF THE ABDOMEN AND PELVIS WITHOUT CONTRAST 3/13/2021 12:28 pm TECHNIQUE: CT of the abdomen and pelvis was performed without the administration of intravenous contrast. Multiplanar reformatted images are provided for review. Dose modulation, iterative reconstruction, and/or weight based adjustment of the mA/kV was utilized to reduce the radiation dose to as low as reasonably achievable. COMPARISON: None. HISTORY: ORDERING SYSTEM PROVIDED HISTORY: pain, fall TECHNOLOGIST PROVIDED HISTORY: pain, fall Decision Support Exception->Emergency Medical Condition (MA) Is the patient pregnant?->No Reason for Exam: fall, rt sided abd bruising Acuity: Acute Type of Exam: Initial Mechanism of Injury: fall FINDINGS: Lower Chest: Lung bases are clear. Organs: Liver is normal in size and density. No focal masses identified. No evidence of intrahepatic ductal dilatation. Spleen is normal size. The gallbladder is surgically absent both adrenal glands are normal.  Pancreas is normal in appearance. Punctate nonobstructing bilateral renal stones. The kidneys are otherwise normal in size and attenuation without evidence of hydronephrosis GI/Bowel: The visualized bowel and mesentery show no mass lesions. Mild colonic diverticulosis. No evidence of diverticulitis. Pelvis: No intrapelvic mass is identified. Bladder and rectum are intact. Peritoneum/Retroperitoneum: No free fluid. No lymphadenopathy. No evidence of pneumoperitoneum. Bones/Soft Tissues: Small fat containing umbilical hernia. Degenerative changes seen in the lumbosacral junction. No acute bony abnormalities.      Small nonobstructing bilateral renal stones no acute intra-abdominal or intrapelvic abnormalities are pain Acuity: Acute Type of Exam: Initial Mechanism of Injury: fell FINDINGS: No fractures or subluxations are seen. There is mild disc space narrowing with eburnation of the vertebral endplates at the  F2-H1 level. The posterior elements are intact. The paraspinal soft tissues are unremarkable except for atherosclerotic changes. No acute bony abnormalities are noted L5-S1 degenerative disc disease. Xr Foot Left (min 3 Views)    Result Date: 3/13/2021  EXAMINATION: THREE XRAY VIEWS OF THE LEFT FOOT 3/13/2021 12:29 pm COMPARISON: 11/23/2018 HISTORY: ORDERING SYSTEM PROVIDED HISTORY: pain, fall TECHNOLOGIST PROVIDED HISTORY: pain, fall Reason for Exam: Lt foot pain Acuity: Acute Type of Exam: Initial Mechanism of Injury: fell FINDINGS: The visualized bones are osteopenic. Fracture of the distal 5th metatarsal. There is no evidence of  dislocation. Progressive degenerative and stable postsurgical changes of the hindfoot. Osteotomy defect of the PIP joint of the 5th digit. Diffuse soft tissue swelling     Fracture of the distal 5th metatarsal.       LABS:  Labs Reviewed   URINALYSIS WITH MICROSCOPIC       All other labs were within normal range or not returned as of this dictation. EMERGENCY DEPARTMENT COURSE and DIFFERENTIAL DIAGNOSIS/MDM:   Vitals:    Vitals:    03/13/21 1417   BP: (!) 179/76   Resp: 14   Temp: 98.3 °F (36.8 °C)   TempSrc: Oral   SpO2: 99%   Weight: 165 lb (74.8 kg)   Height: 4' 11\" (1.499 m)       CLINICAL DECISION MAKING:  The patient presented alert with a nontoxic appearance and was seen in conjunction with Dr. Kvng Bullard. Imaging showed a fracture of the distal 5th metatarsal and acute fracture of the right posterolateral 5th, 6th and 7th ribs with displacement. Her injured happened about a week ago. I spoke with the podiatry resident. She was placed in an ortho boot. The application was checked and was appropriate; the LLE remained NVI. She has a walker and crutches at home.  She was advised to be non-weight bearing with the LLE. Incentive spirometry teaching was done. She has pain medication at home and is in pain management. Follow up with pcp and podiatry, return to ED if condition worsens. FINAL IMPRESSION      1. Fall, initial encounter    2. Closed fracture of multiple ribs of right side, initial encounter    3.  Closed displaced fracture of fifth metatarsal bone of left foot, initial encounter              DISPOSITION/PLAN   DISPOSITION Decision To Discharge 03/13/2021 05:36:16 PM      PATIENT REFERRED TO:   Ema Hinojosa, [address] [facility] [address] [place] [address],[facility] Floor 1  [address]  [phone]    Schedule an appointment as soon as possible for a visit in 3 days  5th met fx follow up    Jero Sotelo MD  [address] [place] [address]    Schedule an appointment as soon as possible for a visit       Sergio Zambrano MD  [address]  [phone]    Schedule an appointment as soon as possible for a visit         DISCHARGE MEDICATIONS:     New Prescriptions    No medications on file           (Please note that portions of this note were completed with a voice recognition program.  Efforts were made to edit the dictations but occasionally words are mis-transcribed.)    ELO Villarreal CNP, APRN - CNP  03/13/21 2048

## 2021-03-13 NOTE — ED NOTES
Pt presents to er for evaluation. Pt states she fell down stairs 1 week ago. Pt states he foot gives out on her and that was the reason for falling. Pt denies loc at time of fall. Pt states she has pain \"all over\". Pt denies recent fever or chills. Pt a&ox3. Skin warm and dry. Respirations even and non-labored.       Hugo Gasca RN  03/13/21 7108

## 2021-03-14 NOTE — ED PROVIDER NOTES
eMERGENCY dEPARTMENT eNCOUnter   Independent Attestation     Pt Name: Maribeth Zuniga  MRN: [id number]  [place] 1965  Date of evaluation: 3/14/21     Maribeth Zuniga is a 48 y.o. female with CC: Fall (one week ago)      Based on the medical record the care appears appropriate. I was personally available for consultation in the Emergency Department.     Dorothea Niño MD  Attending Emergency Physician                    Dorothea Niño MD  03/14/21 9269

## 2021-08-09 ENCOUNTER — HOSPITAL ENCOUNTER (EMERGENCY)
Age: 56
Discharge: HOME | End: 2021-08-09
Attending: EMERGENCY MEDICINE
Payer: COMMERCIAL

## 2021-08-09 VITALS
OXYGEN SATURATION: 99 % | RESPIRATION RATE: 20 BRPM | HEART RATE: 87 BPM | HEIGHT: 59 IN | WEIGHT: 161.5 LBS | BODY MASS INDEX: 32.56 KG/M2 | DIASTOLIC BLOOD PRESSURE: 86 MMHG | TEMPERATURE: 99.4 F | SYSTOLIC BLOOD PRESSURE: 144 MMHG

## 2021-08-09 DIAGNOSIS — R19.7 DIARRHEA, UNSPECIFIED TYPE: Primary | ICD-10-CM

## 2021-08-09 DIAGNOSIS — E87.6 HYPOKALEMIA: ICD-10-CM

## 2021-08-09 DIAGNOSIS — R11.2 NAUSEA AND VOMITING, INTRACTABILITY OF VOMITING NOT SPECIFIED, UNSPECIFIED VOMITING TYPE: ICD-10-CM

## 2021-08-09 LAB
ABSOLUTE EOS #: 0.12 K/UL (ref 0–0.44)
ABSOLUTE IMMATURE GRANULOCYTE: 0.03 K/UL (ref 0–0.3)
ABSOLUTE LYMPH #: 2.19 K/UL (ref 1.1–3.7)
ABSOLUTE MONO #: 1.14 K/UL (ref 0.1–1.2)
ALBUMIN SERPL-MCNC: 4.1 G/DL (ref 3.5–5.2)
ALBUMIN/GLOBULIN RATIO: ABNORMAL (ref 1–2.5)
ALP BLD-CCNC: 125 U/L (ref 35–104)
ALT SERPL-CCNC: 12 U/L (ref 5–33)
ANION GAP SERPL CALCULATED.3IONS-SCNC: 15 MMOL/L (ref 9–17)
AST SERPL-CCNC: 19 U/L
BASOPHILS # BLD: 1 % (ref 0–2)
BASOPHILS ABSOLUTE: 0.07 K/UL (ref 0–0.2)
BILIRUB SERPL-MCNC: 0.95 MG/DL (ref 0.3–1.2)
BILIRUBIN DIRECT: 0.15 MG/DL
BILIRUBIN URINE: NEGATIVE
BILIRUBIN, INDIRECT: 0.8 MG/DL (ref 0–1)
BUN BLDV-MCNC: 6 MG/DL (ref 6–20)
BUN/CREAT BLD: 11 (ref 9–20)
CALCIUM SERPL-MCNC: 9.6 MG/DL (ref 8.6–10.4)
CHLORIDE BLD-SCNC: 97 MMOL/L (ref 98–107)
CO2: 25 MMOL/L (ref 20–31)
COLOR: YELLOW
COMMENT UA: NORMAL
CREAT SERPL-MCNC: 0.56 MG/DL (ref 0.5–0.9)
DIFFERENTIAL TYPE: ABNORMAL
EOSINOPHILS RELATIVE PERCENT: 1 % (ref 1–4)
GFR AFRICAN AMERICAN: >60 ML/MIN
GFR NON-AFRICAN AMERICAN: >60 ML/MIN
GFR SERPL CREATININE-BSD FRML MDRD: ABNORMAL ML/MIN/{1.73_M2}
GFR SERPL CREATININE-BSD FRML MDRD: ABNORMAL ML/MIN/{1.73_M2}
GLOBULIN: ABNORMAL G/DL (ref 1.5–3.8)
GLUCOSE BLD-MCNC: 131 MG/DL (ref 70–99)
GLUCOSE URINE: NEGATIVE
HCT VFR BLD CALC: 45.4 % (ref 36.3–47.1)
HEMOGLOBIN: 15 G/DL (ref 11.9–15.1)
IMMATURE GRANULOCYTES: 0 %
KETONES, URINE: NEGATIVE
LEUKOCYTE ESTERASE, URINE: NEGATIVE
LYMPHOCYTES # BLD: 23 % (ref 24–43)
MCH RBC QN AUTO: 31.8 PG (ref 25.2–33.5)
MCHC RBC AUTO-ENTMCNC: 33 G/DL (ref 28.4–34.8)
MCV RBC AUTO: 96.4 FL (ref 82.6–102.9)
MONOCYTES # BLD: 12 % (ref 3–12)
NITRITE, URINE: NEGATIVE
NRBC AUTOMATED: 0 PER 100 WBC
PDW BLD-RTO: 12.8 % (ref 11.8–14.4)
PH UA: 6.5 (ref 5–8)
PLATELET # BLD: 281 K/UL (ref 138–453)
PLATELET ESTIMATE: ABNORMAL
PMV BLD AUTO: 11 FL (ref 8.1–13.5)
POTASSIUM SERPL-SCNC: 2.9 MMOL/L (ref 3.7–5.3)
PROTEIN UA: NEGATIVE
RBC # BLD: 4.71 M/UL (ref 3.95–5.11)
RBC # BLD: ABNORMAL 10*6/UL
SEG NEUTROPHILS: 63 % (ref 36–65)
SEGMENTED NEUTROPHILS ABSOLUTE COUNT: 5.82 K/UL (ref 1.5–8.1)
SODIUM BLD-SCNC: 137 MMOL/L (ref 135–144)
SPECIFIC GRAVITY UA: 1.01 (ref 1–1.03)
TOTAL PROTEIN: 7.2 G/DL (ref 6.4–8.3)
TURBIDITY: CLEAR
URINE HGB: NEGATIVE
UROBILINOGEN, URINE: NORMAL
WBC # BLD: 9.4 K/UL (ref 3.5–11.3)
WBC # BLD: ABNORMAL 10*3/UL

## 2021-08-09 PROCEDURE — U0005 INFEC AGEN DETEC AMPLI PROBE: HCPCS

## 2021-08-09 PROCEDURE — U0003 INFECTIOUS AGENT DETECTION BY NUCLEIC ACID (DNA OR RNA); SEVERE ACUTE RESPIRATORY SYNDROME CORONAVIRUS 2 (SARS-COV-2) (CORONAVIRUS DISEASE [COVID-19]), AMPLIFIED PROBE TECHNIQUE, MAKING USE OF HIGH THROUGHPUT TECHNOLOGIES AS DESCRIBED BY CMS-2020-01-R: HCPCS

## 2021-08-09 PROCEDURE — 80048 BASIC METABOLIC PNL TOTAL CA: CPT

## 2021-08-09 PROCEDURE — 85025 COMPLETE CBC W/AUTO DIFF WBC: CPT

## 2021-08-09 PROCEDURE — 2580000003 HC RX 258: Performed by: NURSE PRACTITIONER

## 2021-08-09 PROCEDURE — 81003 URINALYSIS AUTO W/O SCOPE: CPT

## 2021-08-09 PROCEDURE — 96374 THER/PROPH/DIAG INJ IV PUSH: CPT

## 2021-08-09 PROCEDURE — 6370000000 HC RX 637 (ALT 250 FOR IP): Performed by: NURSE PRACTITIONER

## 2021-08-09 PROCEDURE — 87506 IADNA-DNA/RNA PROBE TQ 6-11: CPT

## 2021-08-09 PROCEDURE — 80076 HEPATIC FUNCTION PANEL: CPT

## 2021-08-09 PROCEDURE — 6360000002 HC RX W HCPCS: Performed by: NURSE PRACTITIONER

## 2021-08-09 PROCEDURE — 99284 EMERGENCY DEPT VISIT MOD MDM: CPT

## 2021-08-09 RX ORDER — POTASSIUM CHLORIDE 20 MEQ/1
20 TABLET, EXTENDED RELEASE ORAL DAILY
Qty: 10 TABLET | Refills: 0 | Status: SHIPPED | OUTPATIENT
Start: 2021-08-09

## 2021-08-09 RX ORDER — DICYCLOMINE HYDROCHLORIDE 10 MG/1
10 CAPSULE ORAL EVERY 6 HOURS PRN
Qty: 20 CAPSULE | Refills: 0 | Status: SHIPPED | OUTPATIENT
Start: 2021-08-09

## 2021-08-09 RX ORDER — ONDANSETRON 4 MG/1
4 TABLET, ORALLY DISINTEGRATING ORAL EVERY 8 HOURS PRN
Qty: 20 TABLET | Refills: 0 | Status: SHIPPED | OUTPATIENT
Start: 2021-08-09

## 2021-08-09 RX ORDER — 0.9 % SODIUM CHLORIDE 0.9 %
1000 INTRAVENOUS SOLUTION INTRAVENOUS ONCE
Status: COMPLETED | OUTPATIENT
Start: 2021-08-09 | End: 2021-08-09

## 2021-08-09 RX ORDER — ONDANSETRON 2 MG/ML
4 INJECTION INTRAMUSCULAR; INTRAVENOUS ONCE
Status: COMPLETED | OUTPATIENT
Start: 2021-08-09 | End: 2021-08-09

## 2021-08-09 RX ORDER — POTASSIUM CHLORIDE 20 MEQ/1
40 TABLET, EXTENDED RELEASE ORAL ONCE
Status: COMPLETED | OUTPATIENT
Start: 2021-08-09 | End: 2021-08-09

## 2021-08-09 RX ADMIN — ONDANSETRON 4 MG: 2 INJECTION INTRAMUSCULAR; INTRAVENOUS at 20:05

## 2021-08-09 RX ADMIN — SODIUM CHLORIDE 1000 ML: 9 INJECTION, SOLUTION INTRAVENOUS at 20:05

## 2021-08-09 RX ADMIN — POTASSIUM CHLORIDE 40 MEQ: 20 TABLET, EXTENDED RELEASE ORAL at 21:44

## 2021-08-09 ASSESSMENT — PAIN SCALES - GENERAL: PAINLEVEL_OUTOF10: 9

## 2021-08-10 ENCOUNTER — CARE COORDINATION (OUTPATIENT)
Dept: CARE COORDINATION | Age: 56
End: 2021-08-10

## 2021-08-10 LAB
CAMPYLOBACTER PCR: NORMAL
E COLI ENTEROTOXIGENIC PCR: NORMAL
PLESIOMONAS SHIGELLOIDES PCR: NORMAL
SALMONELLA PCR: NORMAL
SARS-COV-2: NORMAL
SARS-COV-2: NOT DETECTED
SHIGATOXIN GENE PCR: NORMAL
SHIGELLA SP PCR: NORMAL
SOURCE: NORMAL
SPECIMEN DESCRIPTION: NORMAL
VIBRIO PCR: NORMAL
YERSINIA ENTEROCOLITICA PCR: NORMAL

## 2021-08-10 NOTE — ED NOTES
Pt provided discharge instructions and educated on prescriptions. Pt instructed to follow up with PCP and return to ED with new / worsened symptoms. Pt verbalizes understanding and denies additional questions or concerns at this time.       Eleuterio Gonzalez RN  08/09/21 0549

## 2021-08-10 NOTE — ED NOTES
Pt updated on results.   Pt resting on stretcher and denies needs at this time      Yoana Salazar RN  08/09/21 9885

## 2021-08-10 NOTE — ACP (ADVANCE CARE PLANNING)
Advance Care Planning   Healthcare Decision Maker:    Primary Decision Maker: Carlos Gastelum - Brother/Sister - [phone]    Click here to complete Healthcare Decision Makers including selection of the Healthcare Decision Maker Relationship (ie \"Primary\"). Today we documented Decision Maker(s) consistent with Legal Next of Kin hierarchy.

## 2021-08-10 NOTE — ED NOTES
Pt to ED c/o diarrhea. Pt reports diarrhea for the past 4 days. Pt states that she has been unable to leave the restroom d/t frequent loose stools. Pt reports associated lower abdominal cramping and nausea. Pt denies emesis. Pt reports abdominal hx of hysterectomy but denies any other abdominal surgeries in the past.  Pt denies fever / chills, denies urinary changes or complaints.    On exam, pt with palpable tenderness to the mid abdomen, pt with low grade fever, pt awake and oriented x 4      Traci Stroud RN  08/09/21 2021

## 2021-08-10 NOTE — ED NOTES
Pt medicated with ordered potassium replacement but pt unable to tolerate at this time as it is making her nauseous.   [place], [place], notified      Calli Pulido RN  08/09/21 7256

## 2021-08-10 NOTE — CARE COORDINATION
Unable to reach patient for ED/ Covid outreach, mail box is full at both phone numbers listed.      SARS-CoV-2 Not Detected  Not Detected Final 08/09/2021  8:10 PM
Discussed and confirmed pulse oximeter discharge instructions and when to notify provider or seek emergency care. LPN CC provided contact information. No further follow-up call identified based on severity of symptoms and risk factors.

## 2021-08-10 NOTE — ED PROVIDER NOTES
eMERGENCY dEPARTMENT eNCOUnter   Independent Attestation     Pt Name: Lourdes Gan  MRN: [id number]  [place] 1965  Date of evaluation: 8/9/21     Lourdes Gan is a 48 y.o. female with CC: Abdominal Pain (started 3days ago) and Diarrhea      Based on the medical record the care appears appropriate. I was personally available for consultation in the Emergency Department.     Farhana Aguillon DO  Attending Emergency Physician                    Alecia Alford DO  08/09/21 4328

## 2021-08-11 ASSESSMENT — ENCOUNTER SYMPTOMS
SINUS PRESSURE: 0
COLOR CHANGE: 0
RHINORRHEA: 0
VOMITING: 0
NAUSEA: 0
SORE THROAT: 0
ABDOMINAL PAIN: 0
CONSTIPATION: 0
SHORTNESS OF BREATH: 0
WHEEZING: 0
DIARRHEA: 0
COUGH: 0

## 2021-08-11 NOTE — ED PROVIDER NOTES
[address] [place] [address] ED  eMERGENCY dEPARTMENT eNCOUnter      Pt Name: Robles Mustafa  MRN: [id number]  [place] 1965  Date of evaluation: 8/9/2021  Provider: Claudine Kitchen NP, ELO - [address]       Chief Complaint   Patient presents with    Abdominal Pain     started 3days ago    Diarrhea         HISTORY OF PRESENT ILLNESS  (Location/Symptom, Timing/Onset, Context/Setting, Quality, Duration, Modifying Factors, Severity.)   Robles Mustafa is a 48 y.o. female who presents to the emergency department today by private vehicle for evaluation of cramping abdominal pain. Patient states for last 3 days she has been experiencing some cramping abdominal pain with diarrhea. She denies any vomiting but has had some nausea. She rates the cramping abdominal pain a 9 on a 0-to-10 scale. She states to get some relief after she has a bowel movement. She denies any hematemesis or hematochezia. Nursing Notes were reviewed. ALLERGIES     Latex, Bactrim [sulfamethoxazole-trimethoprim], and Morphine    CURRENT MEDICATIONS       Discharge Medication List as of 8/9/2021  9:50 PM      CONTINUE these medications which have NOT CHANGED    Details   diphenhydrAMINE (BENADRYL ALLERGY) 25 MG tablet Take 1 tablet by mouth every 6 hours as needed for Itching, Disp-20 tablet, R-0Print      oxyCODONE-acetaminophen (PERCOCET) 7.5-325 MG per tablet Take 1 tablet by mouth every 12 hours as needed for Pain      oxyCODONE HCl ER 20 MG CR tablet Take by mouth every 12 hours      fexofenadine (ALLEGRA) 180 MG tablet Take 180 mg by mouth daily      predniSONE (DELTASONE) 10 MG tablet pack Take 4 tabs daily for 3 days,  Then 3 tabs daily for 3 days,  Then 2 tabs daily for 3 days,  Than 1 tab daily for 3 days. , Disp-30 tablet, R-0, Print      amitriptyline (ELAVIL) 50 MG tablet Take 50 mg by mouth nightly as needed for Sleep. clobetasol (TEMOVATE) 0.05 % ointment Apply  topically 2 times daily as needed (ECZEMA).  Apply topically 2 times daily. , Topical, Historical Med      fluticasone (FLONASE) 50 MCG/ACT nasal spray 2 sprays by Nasal route daily as needed for Rhinitis. hydrochlorothiazide (HYDRODIURIL) 25 MG tablet Take 25 mg by mouth daily. meloxicam (MOBIC) 15 MG tablet Take 15 mg by mouth daily. polyethylene glycol (GLYCOLAX) powder Take 17 g by mouth daily as needed. Potassium (POTASSIMIN PO) Take 1 tablet by mouth daily. metoprolol (TOPROL-XL) 100 MG XL tablet Take 100 mg by mouth daily. Selenium 100 MCG CAPS Take 1 capsule by mouth daily. vitamin B-12 (CYANOCOBALAMIN) 100 MCG tablet Take 50 mcg by mouth daily. Grape Seed 60 MG CAPS Take 1 capsule by mouth daily. Coenzyme Q10 (CO Q 10) 60 MG CAPS Take 1 capsule by mouth daily. WITH FISH OIL      Ferrous Sulfate (IRON) 325 (65 FE) MG TABS Take 1 capsule by mouth daily. Biotin 7500 MCG TABS Take 1 capsule by mouth daily. NONFORMULARY Take 600 mg by mouth daily.  CINNAMON BARK             PAST MEDICAL HISTORY         Diagnosis Date    Arthritis 2007    BILAT KNEES    Bruising 9/23/14    states just appeared, has had before,  Went to PCP, blood work done   [place] Sicard Diabetes mellitus ([facility] [address]     Nedra Counter NO RX    Hypertension 2002    Hypokalemia     CHRONIC       SURGICAL HISTORY           Procedure Laterality Date    BREAST CYST EXCISION Left 1980    FOOT SURGERY Bilateral 1994    HAMMER TOES    HAMMER TOE SURGERY Right 9/25/2014    Right 5th digit repair    HYSTERECTOMY  2013    PARTIAL    JOINT REPLACEMENT Left 2008    PARTIAL KNEE    JOINT REPLACEMENT Left 02/08/2010    TOTAL KNEE    JOINT REPLACEMENT Right 08/20/2012    TOTAL KNEE    TUBAL LIGATION  2011         FAMILY HISTORY           Problem Relation Age of Onset    High Blood Pressure Mother     Other Mother         [facility] Diabetes Father     Asthma Sister     Diabetes Sister     Diabetes Sister     Diabetes Sister      Family Status   Relation Name Status    Mother Jennifer Pino     Father Emily Jewchelsea Sister Shirleen Bamberger Alive    Brother Rogelio Comes MGM      MGF      PGM      PGF      Sister BOYD Alive    Sister QIAN Alive        SOCIAL HISTORY      reports that she has never smoked. She has never used smokeless tobacco. She reports previous alcohol use. She reports current drug use. Drug: Marijuana. REVIEW OF SYSTEMS    (2-9 systems for level 4, 10 or more for level 5)     Review of Systems   Constitutional: Negative for chills, fever and unexpected weight change. HENT: Negative for congestion, rhinorrhea, sinus pressure and sore throat. Respiratory: Negative for cough, shortness of breath and wheezing. Cardiovascular: Negative for chest pain and palpitations. Gastrointestinal: Negative for abdominal pain, constipation, diarrhea, nausea and vomiting. Genitourinary: Negative for dysuria and hematuria. Musculoskeletal: Negative for arthralgias and myalgias. Skin: Negative for color change and rash. Neurological: Negative for dizziness, weakness and headaches. Hematological: Negative for adenopathy. All other systems reviewed and are negative. Except as noted above the remainder of the review of systems was reviewed and negative. PHYSICAL EXAM    (up to 7 for level 4, 8 or more for level 5)     ED Triage Vitals [21 1817]   BP Temp Temp Source Pulse Resp SpO2 Height Weight   (!) 151/100 99.4 °F (37.4 °C) Oral 100 16 97 % 4' 11\" (1.499 m) 161 lb 8 oz (73.3 kg)     (Physical Exam  Vitals reviewed. Constitutional:       Appearance: She is well-developed. HENT:      Head: Normocephalic and atraumatic. Eyes:      Conjunctiva/sclera: Conjunctivae normal.      Pupils: Pupils are equal, round, and reactive to light. Cardiovascular:      Rate and Rhythm: Normal rate and regular rhythm. Pulmonary:      Effort: Pulmonary effort is normal. No respiratory distress.       Breath 3. Hypokalemia          DISPOSITION/PLAN   DISPOSITION Decision To Discharge 08/09/2021 09:49:23 PM      PATIENT REFERRED TO:   Lindsay Cain, [address] [address] [address]  [phone]    Schedule an appointment as soon as possible for a visit       [facility] ED  [address] [facility]  [phone]    If symptoms worsen      DISCHARGE MEDICATIONS:     Discharge Medication List as of 8/9/2021  9:50 PM      START taking these medications    Details   ondansetron (ZOFRAN ODT) 4 MG disintegrating tablet Take 1 tablet by mouth every 8 hours as needed for Nausea, Disp-20 tablet, R-0Print      dicyclomine (BENTYL) 10 MG capsule Take 1 capsule by mouth every 6 hours as needed (cramps), Disp-20 capsule, R-0Print                 (Please note that portions of this note were completed with a voice recognition program.  Efforts were made to edit the dictations but occasionally words are mis-transcribed.)    [facility] EDUARDO, ELO - CNP  Certified Nurse Practitioner        ELO Lezama CNP  08/11/21 8408

## 2022-05-03 NOTE — ED PROVIDER NOTES
The patient was seen and examined by me in conjunction with the mid-level provider. I agree with his/her assessment and treatment plan. Radiographs are not indicated. She has an abrasion on the posterior scalp and tetanus is updated.       Yemi Layne MD  08/24/20 8028 Female

## 2022-07-02 ENCOUNTER — HOSPITAL ENCOUNTER (EMERGENCY)
Age: 57
Discharge: HOME | End: 2022-07-02
Attending: EMERGENCY MEDICINE
Payer: COMMERCIAL

## 2022-07-02 ENCOUNTER — APPOINTMENT (OUTPATIENT)
Dept: GENERAL RADIOLOGY | Age: 57
End: 2022-07-02
Payer: COMMERCIAL

## 2022-07-02 VITALS
SYSTOLIC BLOOD PRESSURE: 138 MMHG | HEART RATE: 54 BPM | DIASTOLIC BLOOD PRESSURE: 88 MMHG | TEMPERATURE: 99 F | OXYGEN SATURATION: 98 % | RESPIRATION RATE: 18 BRPM

## 2022-07-02 DIAGNOSIS — S99.911A INJURY OF RIGHT ANKLE, INITIAL ENCOUNTER: Primary | ICD-10-CM

## 2022-07-02 PROCEDURE — 99283 EMERGENCY DEPT VISIT LOW MDM: CPT

## 2022-07-02 PROCEDURE — 73610 X-RAY EXAM OF ANKLE: CPT

## 2022-07-02 PROCEDURE — 6370000000 HC RX 637 (ALT 250 FOR IP): Performed by: STUDENT IN AN ORGANIZED HEALTH CARE EDUCATION/TRAINING PROGRAM

## 2022-07-02 PROCEDURE — 73630 X-RAY EXAM OF FOOT: CPT

## 2022-07-02 PROCEDURE — 73562 X-RAY EXAM OF KNEE 3: CPT

## 2022-07-02 RX ORDER — ACETAMINOPHEN 500 MG
1000 TABLET ORAL ONCE
Status: COMPLETED | OUTPATIENT
Start: 2022-07-02 | End: 2022-07-02

## 2022-07-02 RX ADMIN — ACETAMINOPHEN 1000 MG: 500 TABLET ORAL at 15:34

## 2022-07-02 ASSESSMENT — PAIN SCALES - GENERAL: PAINLEVEL_OUTOF10: 9

## 2022-07-02 ASSESSMENT — PAIN - FUNCTIONAL ASSESSMENT: PAIN_FUNCTIONAL_ASSESSMENT: 0-10

## 2022-07-02 ASSESSMENT — ENCOUNTER SYMPTOMS
BACK PAIN: 0
VOMITING: 0
COUGH: 0
SHORTNESS OF BREATH: 0
SORE THROAT: 0
ABDOMINAL PAIN: 0

## 2022-07-02 NOTE — ED PROVIDER NOTES
[facility]  ED  Emergency Department Encounter  EmergencyMedicine Resident     Pt Abhijeet Thakur  MRN: [id number]  Rosalindgferika 1965  Date of evaluation: 7/2/22  PCP:  Joelle Johansen MD    This patient was evaluated in the Emergency Department for symptoms described in the history of present illness. The patient was evaluated in the context of the global COVID-19 pandemic, which necessitated consideration that the patient might be at risk for infection with the SARS-CoV-2 virus that causes COVID-19. Institutional protocols and algorithms that pertain to the evaluation of patients at risk for COVID-19 are in a state of rapid change based on information released by regulatory bodies including the CDC and federal and state organizations. These policies and algorithms were followed during the patient's care in the ED. CHIEF COMPLAINT       Fall, R foot injury    HISTORY OF PRESENT ILLNESS  (Location/Symptom, Timing/Onset, Context/Setting, Quality, Duration, Modifying Factors, Severity.)      Joe Villa is a 55 y.o. female who presents with a fall at the grocery store prior to arrival, complaining of right foot, ankle and knee pain. Patient states that she recently had surgery on her right ankle in March 2022, had her follow-up appointment with orthopedic surgery multiple days ago, had x-rays done, states that they were well-healing. Patient is trying to wean off the walking boot at this time. States that she went off of her grocery cart buggy and slipped on some grapes and had a fall onto her right foot. Denies LOC or head injury. Denies chemical AC or AP. PAST MEDICAL / SURGICAL / SOCIAL / FAMILY HISTORY      has a past medical history of Arthritis, Bruising, Diabetes mellitus ([address] Hypertension, and Hypokalemia.      has a past surgical history that includes joint replacement (Left, 2008); joint replacement (Left, 02/08/2010); joint replacement (Right, 08/20/2012); Hysterectomy (2013); Tubal ligation (2011); Foot surgery (Bilateral, 1994); Breast cyst excision (Left, 1980); and Hammer toe surgery (Right, 9/25/2014). Social History     Socioeconomic History    Marital status: Single     Spouse name: Not on file    Number of children: Not on file    Years of education: Not on file    Highest education level: Not on file   Occupational History    Not on file   Tobacco Use    Smoking status: Never Smoker    Smokeless tobacco: Never Used   Vaping Use    Vaping Use: Some days    Substances: THC   Substance and Sexual Activity    Alcohol use: Not Currently     Comment: 2 TO 3 DRINKS A WEEK    Drug use: Yes     Types: Marijuana [place])    Sexual activity: Not on file   Other Topics Concern    Not on file   Social History Narrative    Not on file     Social Determinants of Health     Financial Resource Strain:     Difficulty of Paying Living Expenses: Not on file   Food Insecurity:     Worried About Running Out of Food in the Last Year: Not on file    Harman of Food in the Last Year: Not on file   Transportation Needs:     Lack of Transportation (Medical): Not on file    Lack of Transportation (Non-Medical):  Not on file   Physical Activity:     Days of Exercise per Week: Not on file    Minutes of Exercise per Session: Not on file   Stress:     Feeling of Stress : Not on file   Social Connections:     Frequency of Communication with Friends and Family: Not on file    Frequency of Social Gatherings with Friends and Family: Not on file    Attends Yazdanism Services: Not on file    Active Member of Clubs or Organizations: Not on file    Attends Club or Organization Meetings: Not on file    Marital Status: Not on file   Intimate Partner Violence:     Fear of Current or Ex-Partner: Not on file    Emotionally Abused: Not on file    Physically Abused: Not on file    Sexually Abused: Not on file   Housing Stability:     Unable to Pay for Housing in the Last Year: Not on file    Number of Places Lived in the Last Year: Not on file    Unstable Housing in the Last Year: Not on file       Family History   Problem Relation Age of Onset    High Blood Pressure Mother     Other Mother         BRAIN ANEURISM    Diabetes Father     Asthma Sister     Diabetes Sister     Diabetes Sister     Diabetes Sister        Allergies:  Latex, Bactrim [sulfamethoxazole-trimethoprim], and Morphine    Home Medications:  Prior to Admission medications    Medication Sig Start Date End Date Taking? Authorizing Provider   ondansetron (ZOFRAN ODT) 4 MG disintegrating tablet Take 1 tablet by mouth every 8 hours as needed for Nausea 8/9/21   Des ELO Caal CNP   dicyclomine (BENTYL) 10 MG capsule Take 1 capsule by mouth every 6 hours as needed (cramps) 8/9/21   Des ELO Caal CNP   potassium chloride (KLOR-CON M) 20 MEQ extended release tablet Take 1 tablet by mouth daily 8/9/21   Des Afua, APRN - CNP   diphenhydrAMINE (BENADRYL ALLERGY) 25 MG tablet Take 1 tablet by mouth every 6 hours as needed for Itching 5/8/19   ELO Francis CNP   oxyCODONE-acetaminophen (PERCOCET) 7.5-325 MG per tablet Take 1 tablet by mouth every 12 hours as needed for Pain    Historical Provider, MD   oxyCODONE HCl ER 20 MG CR tablet Take by mouth every 12 hours    Historical Provider, MD   fexofenadine (ALLEGRA) 180 MG tablet Take 180 mg by mouth daily    Historical Provider, MD   predniSONE (DELTASONE) 10 MG tablet pack Take 4 tabs daily for 3 days,  Then 3 tabs daily for 3 days,  Then 2 tabs daily for 3 days,  Than 1 tab daily for 3 days. 3/13/16   Christophe Parham MD   amitriptyline (ELAVIL) 50 MG tablet Take 50 mg by mouth nightly as needed for Sleep. Historical Provider, MD   clobetasol (TEMOVATE) 0.05 % ointment Apply  topically 2 times daily as needed (ECZEMA). Apply topically 2 times daily.     Historical Provider, MD   fluticasone (FLONASE) 50 MCG/ACT nasal spray 2 sprays by Nasal route daily as needed for Rhinitis. Historical Provider, MD   hydrochlorothiazide (HYDRODIURIL) 25 MG tablet Take 25 mg by mouth daily. Historical Provider, MD   meloxicam (MOBIC) 15 MG tablet Take 15 mg by mouth daily. Historical Provider, MD   polyethylene glycol (GLYCOLAX) powder Take 17 g by mouth daily as needed. Historical Provider, MD   Potassium (POTASSIMIN PO) Take 1 tablet by mouth daily. Historical Provider, MD   metoprolol (TOPROL-XL) 100 MG XL tablet Take 100 mg by mouth daily. Historical Provider, MD   Selenium 100 MCG CAPS Take 1 capsule by mouth daily. Historical Provider, MD   vitamin B-12 (CYANOCOBALAMIN) 100 MCG tablet Take 50 mcg by mouth daily. Historical Provider, MD   Grape Seed 60 MG CAPS Take 1 capsule by mouth daily. Historical Provider, MD   Coenzyme Q10 (CO Q 10) 60 MG CAPS Take 1 capsule by mouth daily. WITH FISH OIL    Historical Provider, MD   Ferrous Sulfate (IRON) 325 (65 FE) MG TABS Take 1 capsule by mouth daily. Historical Provider, MD   Biotin 7500 MCG TABS Take 1 capsule by mouth daily. Historical Provider, MD   NONFORMULARY Take 600 mg by mouth daily. CINNAMON BARK    Historical Provider, MD       REVIEW OF SYSTEMS    (2-9 systems for level 4, 10 or more for level 5)      Review of Systems   Constitutional: Negative for chills and fever. HENT: Negative for sore throat. Eyes: Negative for visual disturbance. Respiratory: Negative for cough and shortness of breath. Cardiovascular: Negative for chest pain. Gastrointestinal: Negative for abdominal pain and vomiting. Endocrine: Negative for polyuria. Genitourinary: Negative for dysuria and hematuria. Musculoskeletal: Negative for back pain. Neurological: Negative for light-headedness and headaches. Psychiatric/Behavioral: Negative for confusion.        PHYSICAL EXAM   (up to 7 for level 4, 8 or more for level 5)      INITIAL VITALS:   /88   Pulse 54   Temp 99 °F (37.2 °C) (Oral)   Resp 18   LMP 05/11/2013 (Exact Date)   SpO2 98%     Physical Exam  Constitutional:       Appearance: Normal appearance. HENT:      Head: Normocephalic. Mouth/Throat:      Mouth: Mucous membranes are moist.      Pharynx: Oropharynx is clear. Eyes:      Extraocular Movements: Extraocular movements intact. Pupils: Pupils are equal, round, and reactive to light. Cardiovascular:      Rate and Rhythm: Normal rate and regular rhythm. Pulses: Normal pulses. Heart sounds: Normal heart sounds. Pulmonary:      Effort: Pulmonary effort is normal.      Breath sounds: Normal breath sounds. Abdominal:      Palpations: Abdomen is soft. Tenderness: There is no abdominal tenderness. There is no right CVA tenderness or left CVA tenderness. Musculoskeletal:      Comments: Right posterior lateral medial malleoli tenderness to palpation, right foot tenderness to palpation, right knee tenderness to palpation  Skin is intact, incision from surgery well-healing, minimally swollen and erythematous  No hip tenderness to palpation  Neurovascularly intact distally   Skin:     General: Skin is warm. Capillary Refill: Capillary refill takes less than 2 seconds. Neurological:      General: No focal deficit present. Mental Status: She is alert and oriented to person, place, and time. Mental status is at baseline. DIFFERENTIAL  DIAGNOSIS     PLAN (LABS / IMAGING / EKG):  Orders Placed This Encounter   Procedures    XR KNEE RIGHT (3 VIEWS)    XR ANKLE RIGHT (MIN 3 VIEWS)    XR FOOT RIGHT (MIN 3 VIEWS)    Ice to affected area       MEDICATIONS ORDERED:  Orders Placed This Encounter   Medications    acetaminophen (TYLENOL) tablet 1,000 mg       DDX: Ankle fracture, foot fracture, knee fracture, sprain, contusion, postop pain, laceration, abrasion    DIAGNOSTIC RESULTS / EMERGENCY DEPARTMENT COURSE / MDM   LAB RESULTS:  No results found for this visit on 07/02/22.     IMPRESSION: 52-year-old lady presents to the emergency department after sustaining a fall at the grocery store, complaining of right knee, ankle and foot pain. Recent foot surgery in March 2022 by orthopedic surgery, last follow-up multiple days ago, well-healing. Patient weaning off walking boot. Vital signs stable, physical exam does show some tenderness palpation of the right foot, ankle and knee. Skin is intact, neurovascularly intact distally. Tylenol, ice given. X-rays showing no acute injury. Discussed with patient with regards to follow-up with orthopedic surgery, primary care physician and for strict return precautions. Patient verbalized agreement understanding, stable for discharge. RADIOLOGY:  XR KNEE RIGHT (3 VIEWS)   Final Result   Total right knee arthropasty without acute hardware complication. Postsurgical changes in the foot. Soft tissue swelling about the ankle      No acute bony abnormalities are         XR ANKLE RIGHT (MIN 3 VIEWS)   Final Result   Total right knee arthropasty without acute hardware complication. Postsurgical changes in the foot. Soft tissue swelling about the ankle      No acute bony abnormalities are         XR FOOT RIGHT (MIN 3 VIEWS)   Final Result   Total right knee arthropasty without acute hardware complication. Postsurgical changes in the foot. 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     Postsurgical changes in the foot. Soft tissue swelling about the ankle  No acute bony abnormalities  [EM]      ED Course User Index  [EM] Brian Lambert MD        No notes of EC Admission Criteria type on file. PROCEDURES:  None    CONSULTS:  None    FINAL IMPRESSION      1.  Injury of right ankle, initial encounter          DISPOSITION / [address] PATIENT REFERRED TO:  Janusz Scherer MD  [address] [place] [address] [facility]  [phone]    Schedule an appointment as soon as possible for a visit   For follow up    [facility]  [address] [facility] [address] [facility] [address]  Go to   As needed    CONT[facility] [address] [facility]  [phone]  Schedule an appointment as soon as possible for a visit   For follow up      DISCHARGE MEDICATIONS:  New Prescriptions    No medications on file       Weston Shane MD  Emergency Medicine Resident    (Please note that portions of thisnote were completed with a voice recognition program.  Efforts were made to edit the dictations but occasionally words are mis-transcribed.)        Weston Shane MD  Resident  07/02/22 0971

## 2022-07-02 NOTE — ED TRIAGE NOTES
Patient states she was in the grocery store and fell on grapes and liquid that was on the floor, patient states she recent [facility] wanted to make sure she didn't re injure anything. Patient rated her pain a 9 on 0-10 pain scale, has been assessed by the resident orders to be followed as directed.  Vitals obtained and WNL

## 2022-07-02 NOTE — ED PROVIDER NOTES
[facility] ED     Emergency Department     Faculty Attestation    I performed a history and physical examination of the patient and discussed management with the resident. I reviewed the residents note and agree with the documented findings and plan of care. Any areas of disagreement are noted on the chart. I was personally present for the key portions of any procedures. I have documented in the chart those procedures where I was not present during the key portions. I have reviewed the emergency nurses triage note. I agree with the chief complaint, past medical history, past surgical history, allergies, medications, social and family history as documented unless otherwise noted below. For Physician Assistant/ Nurse Practitioner cases/documentation I have personally evaluated this patient and have completed at least one if not all key elements of the E/M (history, physical exam, and MDM). Additional findings are as noted. Patient here with right knee foot pain after tripping at the grocery store. Patient is just over 2 months out from an ORIF at [facility] is wearing a walking boot. She states she is only been using it for couple hours a day because she has been doing so well just had an appointment with orthopedic surgeon 2 days ago. On exam surgical scars all well-appearing at the anterior knee as well as at the right foot and ankle. Strong distal pulses intact sensation.   Will image for any new fractures or hardware failure      Critical Care     none    Valeriano Montana MD, Yodit Brady  Attending Emergency  Physician             Valeriano Montana MD  07/02/22 0258